# Patient Record
Sex: FEMALE | Race: WHITE
[De-identification: names, ages, dates, MRNs, and addresses within clinical notes are randomized per-mention and may not be internally consistent; named-entity substitution may affect disease eponyms.]

---

## 2019-12-11 ENCOUNTER — HOSPITAL ENCOUNTER (OUTPATIENT)
Dept: HOSPITAL 92 - DTY/OP | Age: 29
Discharge: HOME | End: 2019-12-11
Attending: OBSTETRICS & GYNECOLOGY
Payer: COMMERCIAL

## 2019-12-11 DIAGNOSIS — O24.410: Primary | ICD-10-CM

## 2019-12-11 PROCEDURE — 97802 MEDICAL NUTRITION INDIV IN: CPT

## 2020-02-11 ENCOUNTER — HOSPITAL ENCOUNTER (INPATIENT)
Dept: HOSPITAL 92 - L&D | Age: 30
LOS: 2 days | Discharge: HOME | End: 2020-02-13
Attending: OBSTETRICS & GYNECOLOGY | Admitting: OBSTETRICS & GYNECOLOGY
Payer: COMMERCIAL

## 2020-02-11 VITALS — BODY MASS INDEX: 31.3 KG/M2

## 2020-02-11 DIAGNOSIS — Z3A.39: ICD-10-CM

## 2020-02-11 DIAGNOSIS — O34.211: Primary | ICD-10-CM

## 2020-02-11 LAB
GLUCOSE SERPL-MCNC: 74 MG/DL (ref 70–105)
HBSAG INDEX: 0.18 S/CO (ref 0–0.99)
HGB BLD-MCNC: 11.3 G/DL (ref 12–16)
MCH RBC QN AUTO: 27.2 PG (ref 27–31)
MCV RBC AUTO: 81.7 FL (ref 78–98)
PLATELET # BLD AUTO: 219 THOU/UL (ref 130–400)
RBC # BLD AUTO: 4.17 MILL/UL (ref 4.2–5.4)
SYPHILIS ANTIBODY INDEX: 0.04 S/CO
WBC # BLD AUTO: 11.3 THOU/UL (ref 4.8–10.8)

## 2020-02-11 PROCEDURE — 86780 TREPONEMA PALLIDUM: CPT

## 2020-02-11 PROCEDURE — 82947 ASSAY GLUCOSE BLOOD QUANT: CPT

## 2020-02-11 PROCEDURE — 87804 INFLUENZA ASSAY W/OPTIC: CPT

## 2020-02-11 PROCEDURE — 86901 BLOOD TYPING SEROLOGIC RH(D): CPT

## 2020-02-11 PROCEDURE — 36415 COLL VENOUS BLD VENIPUNCTURE: CPT

## 2020-02-11 PROCEDURE — 87340 HEPATITIS B SURFACE AG IA: CPT

## 2020-02-11 PROCEDURE — 86900 BLOOD TYPING SEROLOGIC ABO: CPT

## 2020-02-11 PROCEDURE — 86850 RBC ANTIBODY SCREEN: CPT

## 2020-02-11 PROCEDURE — 51702 INSERT TEMP BLADDER CATH: CPT

## 2020-02-11 PROCEDURE — 85027 COMPLETE CBC AUTOMATED: CPT

## 2020-02-11 RX ADMIN — Medication SCH: at 09:53

## 2020-02-11 RX ADMIN — DOCUSATE CALCIUM SCH MG: 240 CAPSULE, LIQUID FILLED ORAL at 20:27

## 2020-02-11 RX ADMIN — DOCUSATE CALCIUM SCH: 240 CAPSULE, LIQUID FILLED ORAL at 12:15

## 2020-02-11 NOTE — OP
DATE OF PROCEDURE:  2020



PREOPERATIVE DIAGNOSES:  Term gestation, previous  section.



POSTOPERATIVE DIAGNOSES:  Term gestation, previous  section.



PROCEDURE:  Repeat low cervical transverse  section.



ASSISTANT:  Jori Sheehan DO.



DESCRIPTION OF PROCEDURE:  Under spinal anesthesia, the patient was placed in a

supine position, prepped and draped in a sterile fashion.  A Pfannenstiel skin scar

was excised.  The abdomen was opened in layers.  The vesicouterine fold of

peritoneum was incised, created a bladder flap.  The lower uterine segment was

incised transversely.  The vertex of the infant was delivered via aid of fundal

pressure.  The placenta was manually removed.  The uterine incision was closed with

continuous locking suture of 0 Vicryl.  Hemostasis was obtained with interrupted

figure-of-eight sutures of 0 Vicryl.  The fascia and subcuticular layers were closed

with continuous suture of 0 Vicryl and a continuous stitch of 3-0 Monocryl

respectively.  Good hemostasis was noted at each layer of closure.  Sponge and

needle counts were correct.  Estimated blood loss was 600 mL.  She tolerated the

procedure well, sent to recovery room in stable condition. 







Job ID:  [id number]

## 2020-02-12 LAB
HGB BLD-MCNC: 8.6 G/DL (ref 12–16)
MCH RBC QN AUTO: 28.8 PG (ref 27–31)
MCV RBC AUTO: 82.5 FL (ref 78–98)
PLATELET # BLD AUTO: 132 THOU/UL (ref 130–400)
RBC # BLD AUTO: 3 MILL/UL (ref 4.2–5.4)
WBC # BLD AUTO: 7.4 THOU/UL (ref 4.8–10.8)

## 2020-02-12 RX ADMIN — HYDROCODONE BITARTRATE AND ACETAMINOPHEN PRN TAB: 5; 325 TABLET ORAL at 01:05

## 2020-02-12 RX ADMIN — Medication SCH ML: at 08:38

## 2020-02-12 RX ADMIN — SIMETHICONE PRN MG: 80 TABLET, CHEWABLE ORAL at 08:37

## 2020-02-12 RX ADMIN — DOCUSATE CALCIUM SCH MG: 240 CAPSULE, LIQUID FILLED ORAL at 08:38

## 2020-02-12 RX ADMIN — HYDROCODONE BITARTRATE AND ACETAMINOPHEN PRN TAB: 5; 325 TABLET ORAL at 16:37

## 2020-02-12 RX ADMIN — DOCUSATE CALCIUM SCH MG: 240 CAPSULE, LIQUID FILLED ORAL at 22:28

## 2020-02-12 RX ADMIN — SIMETHICONE PRN MG: 80 TABLET, CHEWABLE ORAL at 22:29

## 2020-02-12 NOTE — PDOC.PP
Post Partum Progress Note


Post Partum Day #: 1


Subjective: 


Episodic non-productive cough


PO intake tolerated: yes


 Vital Signs (12 hours)











  Temp Pulse Resp BP Pulse Ox


 


 02/12/20 03:33  98.0 F  73  16  100/58 L  97


 


 02/12/20 00:42  98.0 F  79  16  111/78  96


 


 02/11/20 19:10  98.6 F  81  16  114/59 L  98








 Weight











Weight                         90.718 kg

















- Physical Examination


General: NAD


Abdominal: lochia (normal)


Result Diagrams: 


 02/12/20 05:40





 02/11/20 06:12


Additional Labs: 


 Post Partum Labs











Blood Type  A POSITIVE   02/11/20  06:47    


 


Hep Bs Antigen  Non-Reactive S/CO (NonReactive)   02/11/20  06:12    














- Assessment/Plan





Doing well

## 2020-02-13 VITALS — SYSTOLIC BLOOD PRESSURE: 115 MMHG | TEMPERATURE: 97.6 F | DIASTOLIC BLOOD PRESSURE: 75 MMHG

## 2020-02-13 RX ADMIN — HYDROCODONE BITARTRATE AND ACETAMINOPHEN PRN TAB: 5; 325 TABLET ORAL at 01:39

## 2020-02-13 RX ADMIN — DOCUSATE CALCIUM SCH MG: 240 CAPSULE, LIQUID FILLED ORAL at 09:34

## 2020-02-13 NOTE — PDOC.PP
Post Partum Progress Note


PO intake tolerated: yes


Flatus: yes


Ambulation: yes


 Vital Signs (12 hours)











  Temp Pulse Resp BP Pulse Ox


 


 02/13/20 02:03  98.6 F  76  16  111/67 


 


 02/12/20 19:31  98.4 F  76  16  111/55 L  95








 Weight











Weight                         90.718 kg

















- Physical Examination


General: NAD


Abdominal: lochia (normal)


Skin: CS incision dry & intact


Result Diagrams: 


 02/12/20 05:40





 02/11/20 06:12


Additional Labs: 


 Post Partum Labs











Blood Type  A POSITIVE   02/11/20  06:47    


 


Hep Bs Antigen  Non-Reactive S/CO (NonReactive)   02/11/20  06:12    














- Assessment/Plan





Doing well. Wants to go home

## 2022-06-07 ENCOUNTER — HOSPITAL ENCOUNTER (OUTPATIENT)
Dept: HOSPITAL 92 - LABBT | Age: 32
Discharge: HOME | End: 2022-06-07
Attending: ORTHOPAEDIC SURGERY
Payer: COMMERCIAL

## 2022-06-07 DIAGNOSIS — S82.62XA: Primary | ICD-10-CM

## 2022-06-07 DIAGNOSIS — Z20.822: ICD-10-CM

## 2022-06-07 PROCEDURE — U0003 INFECTIOUS AGENT DETECTION BY NUCLEIC ACID (DNA OR RNA); SEVERE ACUTE RESPIRATORY SYNDROME CORONAVIRUS 2 (SARS-COV-2) (CORONAVIRUS DISEASE [COVID-19]), AMPLIFIED PROBE TECHNIQUE, MAKING USE OF HIGH THROUGHPUT TECHNOLOGIES AS DESCRIBED BY CMS-2020-01-R: HCPCS

## 2022-06-07 PROCEDURE — U0005 INFEC AGEN DETEC AMPLI PROBE: HCPCS

## 2022-06-08 ENCOUNTER — HOSPITAL ENCOUNTER (OUTPATIENT)
Dept: HOSPITAL 92 - SDC | Age: 32
Discharge: HOME | End: 2022-06-08
Attending: ORTHOPAEDIC SURGERY
Payer: COMMERCIAL

## 2022-06-08 VITALS — BODY MASS INDEX: 26.6 KG/M2
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
## 2022-06-08 DIAGNOSIS — S82.62XA: Primary | ICD-10-CM

## 2022-06-08 PROCEDURE — 76000 FLUOROSCOPY <1 HR PHYS/QHP: CPT

## 2022-06-08 PROCEDURE — U0005 INFEC AGEN DETEC AMPLI PROBE: HCPCS

## 2022-06-08 PROCEDURE — U0003 INFECTIOUS AGENT DETECTION BY NUCLEIC ACID (DNA OR RNA); SEVERE ACUTE RESPIRATORY SYNDROME CORONAVIRUS 2 (SARS-COV-2) (CORONAVIRUS DISEASE [COVID-19]), AMPLIFIED PROBE TECHNIQUE, MAKING USE OF HIGH THROUGHPUT TECHNOLOGIES AS DESCRIBED BY CMS-2020-01-R: HCPCS

## 2022-06-08 PROCEDURE — C1713 ANCHOR/SCREW BN/BN,TIS/BN: HCPCS

## 2022-06-08 PROCEDURE — 0QSK04Z REPOSITION LEFT FIBULA WITH INTERNAL FIXATION DEVICE, OPEN APPROACH: ICD-10-PCS | Performed by: ORTHOPAEDIC SURGERY

## 2022-06-08 PROCEDURE — 3E0T3BZ INTRODUCTION OF ANESTHETIC AGENT INTO PERIPHERAL NERVES AND PLEXI, PERCUTANEOUS APPROACH: ICD-10-PCS | Performed by: ORTHOPAEDIC SURGERY

## 2023-03-10 ENCOUNTER — HOSPITAL ENCOUNTER (INPATIENT)
Dept: HOSPITAL 92 - CSHLD | Age: 33
LOS: 1 days | Discharge: HOME | End: 2023-03-11
Attending: OBSTETRICS & GYNECOLOGY | Admitting: OBSTETRICS & GYNECOLOGY
Payer: COMMERCIAL

## 2023-03-10 VITALS — BODY MASS INDEX: 28.1 KG/M2

## 2023-03-10 VITALS — TEMPERATURE: 102.5 F

## 2023-03-10 DIAGNOSIS — Z37.1: ICD-10-CM

## 2023-03-10 DIAGNOSIS — Z3A.21: ICD-10-CM

## 2023-03-10 DIAGNOSIS — O36.4XX0: Primary | ICD-10-CM

## 2023-03-10 LAB
HGB BLD-MCNC: 12.1 G/DL (ref 12–15.5)
MCH RBC QN AUTO: 27.7 PG (ref 27–33)
MCV RBC AUTO: 84.4 FL (ref 81.6–98.3)
PLATELET # BLD AUTO: 281 10X3/UL (ref 150–450)
RBC # BLD AUTO: 4.37 10X6/UL (ref 3.9–5.03)
WBC # BLD AUTO: 8 10X3/UL (ref 3.5–10.5)

## 2023-03-10 PROCEDURE — 88305 TISSUE EXAM BY PATHOLOGIST: CPT

## 2023-03-10 PROCEDURE — 76815 OB US LIMITED FETUS(S): CPT

## 2023-03-10 PROCEDURE — 86901 BLOOD TYPING SEROLOGIC RH(D): CPT

## 2023-03-10 PROCEDURE — 51702 INSERT TEMP BLADDER CATH: CPT

## 2023-03-10 PROCEDURE — 86850 RBC ANTIBODY SCREEN: CPT

## 2023-03-10 PROCEDURE — 86900 BLOOD TYPING SEROLOGIC ABO: CPT

## 2023-03-10 PROCEDURE — 85027 COMPLETE CBC AUTOMATED: CPT

## 2023-03-11 PROCEDURE — 3E0DXGC INTRODUCTION OF OTHER THERAPEUTIC SUBSTANCE INTO MOUTH AND PHARYNX, EXTERNAL APPROACH: ICD-10-PCS | Performed by: OBSTETRICS & GYNECOLOGY

## 2024-11-05 LAB
HCT VFR BLD CALC: 33.6 % (ref 34.9–44.5)
HGB BLD-MCNC: 11 G/DL (ref 12–15.5)
HIV 1/2 INDEX: 0.07 S/CO (ref ?–1)
PLATELET # BLD AUTO: 209 10X3/UL (ref 150–450)
SYPHILIS ANTIBODY INDEX: 0.04 S/CO

## 2024-11-06 ENCOUNTER — HOSPITAL ENCOUNTER (INPATIENT)
Dept: HOSPITAL 92 - CSHLD | Age: 34
LOS: 2 days | Discharge: HOME | End: 2024-11-08
Attending: OBSTETRICS & GYNECOLOGY | Admitting: OBSTETRICS & GYNECOLOGY
Payer: COMMERCIAL

## 2024-11-06 VITALS — BODY MASS INDEX: 29.7 KG/M2

## 2024-11-06 DIAGNOSIS — Z79.899: ICD-10-CM

## 2024-11-06 DIAGNOSIS — Z3A.39: ICD-10-CM

## 2024-11-06 DIAGNOSIS — O34.211: Primary | ICD-10-CM

## 2024-11-06 PROCEDURE — 86900 BLOOD TYPING SEROLOGIC ABO: CPT

## 2024-11-06 PROCEDURE — 85014 HEMATOCRIT: CPT

## 2024-11-06 PROCEDURE — 85049 AUTOMATED PLATELET COUNT: CPT

## 2024-11-06 PROCEDURE — 87389 HIV-1 AG W/HIV-1&-2 AB AG IA: CPT

## 2024-11-06 PROCEDURE — 51702 INSERT TEMP BLADDER CATH: CPT

## 2024-11-06 PROCEDURE — 86901 BLOOD TYPING SEROLOGIC RH(D): CPT

## 2024-11-06 PROCEDURE — 36415 COLL VENOUS BLD VENIPUNCTURE: CPT

## 2024-11-06 PROCEDURE — 85018 HEMOGLOBIN: CPT

## 2024-11-06 PROCEDURE — 85027 COMPLETE CBC AUTOMATED: CPT

## 2024-11-06 PROCEDURE — 86780 TREPONEMA PALLIDUM: CPT

## 2024-11-06 PROCEDURE — 87340 HEPATITIS B SURFACE AG IA: CPT

## 2024-11-06 PROCEDURE — 86850 RBC ANTIBODY SCREEN: CPT

## 2024-11-06 RX ADMIN — HYDROMORPHONE HYDROCHLORIDE SCH MG: 1 INJECTION, SOLUTION INTRAMUSCULAR; INTRAVENOUS; SUBCUTANEOUS at 18:00

## 2024-11-06 RX ADMIN — Medication ONE: at 16:41

## 2024-11-06 RX ADMIN — ONDANSETRON ONE: 2 INJECTION INTRAMUSCULAR; INTRAVENOUS at 16:41

## 2024-11-06 RX ADMIN — FAMOTIDINE PRN MG: 10 INJECTION, SOLUTION INTRAVENOUS at 11:24

## 2024-11-06 RX ADMIN — KETOROLAC TROMETHAMINE ONE: 30 INJECTION, SOLUTION INTRAMUSCULAR at 16:40

## 2024-11-06 RX ADMIN — Medication ONE: at 16:40

## 2024-11-06 RX ADMIN — TETANUS TOXOID, REDUCED DIPHTHERIA TOXOID AND ACELLULAR PERTUSSIS VACCINE, ADSORBED ONE: 5; 2.5; 8; 8; 2.5 SUSPENSION INTRAMUSCULAR at 16:41

## 2024-11-07 LAB
HCT VFR BLD CALC: 28.5 % (ref 34.9–44.5)
HGB BLD-MCNC: 9 G/DL (ref 12–15.5)
MCH RBC QN AUTO: 27.1 PG (ref 27–33)
MCV RBC AUTO: 85.8 FL (ref 81.6–98.3)
PLATELET # BLD AUTO: 184 10X3/UL (ref 150–450)
RBC # BLD AUTO: 3.32 10X6/UL (ref 3.9–5.03)
WBC # BLD AUTO: 14.1 10X3/UL (ref 3.5–10.5)

## 2024-11-07 RX ADMIN — KETOROLAC TROMETHAMINE PRN MG: 30 INJECTION, SOLUTION INTRAMUSCULAR at 03:37

## 2024-11-07 RX ADMIN — HYDROCODONE BITARTRATE AND ACETAMINOPHEN PRN TAB: 5; 325 TABLET ORAL at 08:08

## 2024-11-07 RX ADMIN — SIMETHICONE PRN MG: 80 TABLET, CHEWABLE ORAL at 08:08

## 2024-11-08 VITALS — SYSTOLIC BLOOD PRESSURE: 110 MMHG | DIASTOLIC BLOOD PRESSURE: 57 MMHG | TEMPERATURE: 97.8 F

## 2024-11-08 RX ADMIN — HYDROCODONE BITARTRATE AND ACETAMINOPHEN PRN TAB: 5; 325 TABLET ORAL at 03:50
